# Patient Record
Sex: MALE | Race: WHITE | NOT HISPANIC OR LATINO | Employment: FULL TIME | URBAN - METROPOLITAN AREA
[De-identification: names, ages, dates, MRNs, and addresses within clinical notes are randomized per-mention and may not be internally consistent; named-entity substitution may affect disease eponyms.]

---

## 2021-01-13 ENCOUNTER — TELEPHONE (OUTPATIENT)
Dept: INTERNAL MEDICINE | Age: 31
End: 2021-01-13

## 2021-01-20 ENCOUNTER — OFFICE VISIT (OUTPATIENT)
Dept: INTERNAL MEDICINE | Age: 31
End: 2021-01-20

## 2021-01-20 VITALS
DIASTOLIC BLOOD PRESSURE: 70 MMHG | WEIGHT: 165 LBS | HEIGHT: 68 IN | HEART RATE: 66 BPM | SYSTOLIC BLOOD PRESSURE: 110 MMHG | TEMPERATURE: 97.1 F | OXYGEN SATURATION: 99 % | BODY MASS INDEX: 25.01 KG/M2

## 2021-01-20 DIAGNOSIS — Z00.00 ENCOUNTER FOR ANNUAL HEALTH EXAMINATION: Primary | ICD-10-CM

## 2021-01-20 PROCEDURE — 99385 PREV VISIT NEW AGE 18-39: CPT | Performed by: INTERNAL MEDICINE

## 2021-01-20 RX ORDER — DIPHENOXYLATE HYDROCHLORIDE AND ATROPINE SULFATE 2.5; .025 MG/1; MG/1
TABLET ORAL DAILY
COMMUNITY
End: 2021-08-25

## 2021-01-20 NOTE — PATIENT INSTRUCTIONS
** IMPORTANT MESSAGE FROM DR. LAU **    In our office, your satisfaction is VERY important to us.     You may receive a survey from [facility] by mail or E-mail for you to provide feedback about your visit. This information is invaluable for me to know what we can do to improve our services.     I ask that you please take a few minutes to complete the survey and let us know how we are doing in serving your needs. (You may receive the survey more than once for multiple visits)    Thank You !    Dr. Lau & Staff    _________________________________________________________________________________________________________________________      ** ADDITIONAL INSTRUCTION / REMINDERS FROM DR. LAU **

## 2021-01-20 NOTE — PROGRESS NOTES
"    I N T E R N A L  M E D I C I N E  J U N O H  K I M,  M D      ENCOUNTER DATE:  2021    Brant Ronidanika Iniguez / 30 y.o. / male    CHIEF COMPLAINT     Establish Care      VITALS     Visit Vitals  /70   Pulse 66   Temp 97.1 °F (36.2 °C)   Ht 172.7 cm (68\")   Wt 74.8 kg (165 lb)   SpO2 99%   BMI 25.09 kg/m²       BP Readings from Last 3 Encounters:   21 110/70     Wt Readings from Last 3 Encounters:   21 74.8 kg (165 lb)      Body mass index is 25.09 kg/m².    MEDICATIONS     Current Outpatient Medications   Medication Sig Dispense Refill   • Multiple Vitamins-Minerals (ZINC PO) Take  by mouth.     • multivitamin (MULTI-DAY PO) Take  by mouth Daily.     • QUERCETIN PO Take  by mouth Daily.       No current facility-administered medications for this visit.        _____________________________________________________________________________________    HISTORY OF PRESENT ILLNESS      Brant presents for annual health maintenance visit.  Healthy with no chronic medical problems.   History of neck injury (college football) with torn ligaments with intermittent neck pain and headaches.   Father had a stroke in his 50's (no known risk factor x prior smoking history) and PGF who  at early age.     · Last health maintenance visit: many years ago  · General health: good  · Lifestyle:  · Attempting to lose weight?: No   · Diet: eats moderately healthy  · Exercise: exercises 4 days/week  · Tobacco: Never used   · Alcohol: does not drink  · Work: Full-time  · Reproductive health:  · Sexually active?: Yes   · Concern for STD?: No   · Sexual problems?: No problems   · Sees Urologist?: No   · Depression Screening:      PHQ-2/PHQ-9 Depression Screening 2021   Little interest or pleasure in doing things 0   Feeling down, depressed, or hopeless 0   Total Score 0         PHQ-2: 0 (Not depressed)     PHQ-9: 0 (Negative screening for depression)    Patient Care Team:  Neo Hayes MD as PCP " - General (Internal Medicine)  ______________________________________________________________________    ALLERGIES  No Known Allergies     PFSH:     The following portions of the patient's history were reviewed and updated as appropriate: Allergies / Current Medications / Past Medical History / Surgical History / Social History / Family History    PROBLEM LIST   There is no problem list on file for this patient.      PAST MEDICAL HISTORY  History reviewed. No pertinent past medical history.    SURGICAL HISTORY  History reviewed. No pertinent surgical history.    SOCIAL HISTORY  Social History     Socioeconomic History   • Marital status:      Spouse name: Dennis   • Number of children: 2   • Years of education: Not on file   • Highest education level: Not on file   Occupational History   • Occupation:  ([employer])   Tobacco Use   • Smoking status: Never Smoker   • Smokeless tobacco: Never Used   Substance and Sexual Activity   • Alcohol use: Not Currently     Frequency: Monthly or less     Comment: rare   • Drug use: Never   • Sexual activity: Yes   Lifestyle   • Physical activity     Days per week: 4 days     Minutes per session: Not on file   • Stress: Only a little       FAMILY HISTORY  Family History   Problem Relation Age of Onset   • Breast cancer Mother    • Diabetes Mother    • Hypertension Mother    • Hyperlipidemia Mother    • Stroke Father 50   • No Known Problems Brother    • No Known Problems Daughter    • No Known Problems Son    • Early death Paternal Grandfather 50   • Colon cancer Neg Hx    • Prostate cancer Neg Hx        IMMUNIZATION HISTORY    There is no immunization history on file for this patient.    ______________________________________________________________________    REVIEW OF SYSTEMS     Review of Systems   Constitutional: Negative.    Eyes: Negative.    Respiratory: Negative.    Cardiovascular: Negative.    Gastrointestinal: Negative.     Endocrine: Negative.    Genitourinary: Negative.    Musculoskeletal: Positive for neck pain.   Skin: Negative.    Allergic/Immunologic: Negative.    Neurological: Positive for headaches.   Hematological: Negative.    Psychiatric/Behavioral: Negative.          PHYSICAL EXAMINATION     Physical Exam  Constitutional:       General: He is not in acute distress.     Appearance: He is well-developed.   HENT:      Head: Normocephalic and atraumatic.      Right Ear: Tympanic membrane, ear canal and external ear normal.      Left Ear: Tympanic membrane, ear canal and external ear normal.   Eyes:      General: No scleral icterus.     Conjunctiva/sclera: Conjunctivae normal.      Pupils: Pupils are equal, round, and reactive to light.   Neck:      Musculoskeletal: Neck supple.      Thyroid: No thyroid mass or thyromegaly.      Trachea: No tracheal deviation.   Cardiovascular:      Rate and Rhythm: Normal rate and regular rhythm.      Heart sounds: Normal heart sounds.      Comments: No carotid bruit  Pulmonary:      Effort: Pulmonary effort is normal.      Breath sounds: Normal breath sounds.   Abdominal:      General: There is no distension.      Palpations: Abdomen is soft. There is no mass.      Tenderness: There is no abdominal tenderness.      Hernia: No hernia is present.   Musculoskeletal: Normal range of motion.   Lymphadenopathy:      Cervical: No cervical adenopathy.      Upper Body:      Right upper body: No supraclavicular adenopathy.      Left upper body: No supraclavicular adenopathy.   Skin:     General: Skin is warm.      Coloration: Skin is not pale.      Findings: No lesion (Negative for suspicious skin lesions/growths) or rash.      Comments: No jaundice  No suspicious skin lesions.    Neurological:      Mental Status: He is alert and oriented to person, place, and time.      Cranial Nerves: No cranial nerve deficit.      Motor: No abnormal muscle tone.   Psychiatric:         Mood and Affect: Mood normal.          Behavior: Behavior normal.         Thought Content: Thought content normal.         Judgment: Judgment normal.         REVIEWED DATA      Labs:    No results found for: NA, K, CALCIUM, AST, ALT, BUN, CREATININE, EGFRIFNONA, EGFRIFAFRI    No results found for: GLU, HGBA1C, TSH, FREET4    No results found for: PSA    No results found for: TESTOSTERONE, TESTOSTEROTT, TESTFRE  No results found for: PSA, HCT    No results found for: LDL, HDL, TRIG, CHOLHDLRATIO    No components found for: VTDT361N    No results found for: WBC, HGB, MCV, PLT    No results found for: PROTEIN, GLUCOSEU, BLOODU, NITRITEU, LEUKOCYTESUR     No results found for: HEPCVIRUSABY    Imaging:           Medical Tests:       ______________________________________________________________________    ASSESSMENT & PLAN     ANNUAL WELLNESS EXAM / PHYSICAL     Other medical problems addressed today:  Problem List Items Addressed This Visit     None      Visit Diagnoses     Encounter for annual health examination    -  Primary    Relevant Orders    CBC & Differential    Comprehensive Metabolic Panel    Hemoglobin A1c    Lipid Panel With / Chol / HDL Ratio    TSH+Free T4    Urinalysis With Microscopic If Indicated (No Culture) - Urine, Clean Catch    Hepatitis C Antibody          Summary/Discussion:     · Overall healthy 31 yo male with some family history of early CV disease.   · Evaluate CV / metabolic risk ; check labs     Next Appointment with me: Visit date not found    No follow-ups on file.      HEALTHCARE MAINTENANCE ISSUES       Cancer Screening:  · Colon: Initial/Next screening at age: 44  · Repeat colon cancer screening: N/A at this time  · Prostate: No screening needed at this time  · Testicular: Recommended monthly self exam  · Skin: Monthly self skin examination, annual exam by health professional  · Lung: Does not meet criteria for lung cancer screening.   · Other:    Screening Labs & Tests:  · Lab results reviewed & discussed with with  patient or orders placed today.  · EKG:  · CV Screening: Lipid panel  · DEXA (75+ or risk factors):   · HEP C (If born [id number] or risk factors): Ordered  · Other:     Immunization/Vaccinations (to be given today unless deferred by patient)  · Influenza: Patient deferred/declined flu vaccine (recommended annual vaccination)  · Hepatitis A: Recommended at pharmacy  · Hepatitis B: Not needed at this time  · Tetanus/Pertussis: Verify immunization records  · Pneumovax: Not needed at this time  · Shingles: Not needed at this time  · Other: Had 1st COVID-19 vaccine     Lifestyle Counseling:  · Lifestyle Modifications: Continue good lifestyle choices/modifications, Follow a low fat, low cholesterol diet, Reduce exposure to stress if possible and Discussed sexual issues, safe sex practices, contraception  · Safety Issues: Always wear seatbelt, Avoid texting while driving   · Use sunscreen, regular skin examination  · Recommended annual dental/vision examination.  · Emotional/Stress/Sleep: Reviewed and  given when appropriate      Health Maintenance   Topic Date Due   • ANNUAL PHYSICAL  09/06/1992   • TDAP/TD VACCINES (1 - Tdap) 09/06/2008   • INFLUENZA VACCINE  08/01/2020   • HEPATITIS C SCREENING  01/13/2021   • Pneumococcal Vaccine 0-64  Aged Out   • MENINGOCOCCAL VACCINE  Aged Out         *Examiner was wearing KN95 mask, face shield and exam gloves during the entire duration of the visit. Patient was masked the entire time.   Minimum social distance of 6 ft maintained entire visit except if physical contact was necessary as documented.     **Dragon Disclaimer:   Much of this encounter note is an electronic transcription/translation of spoken language to printed text. The electronic translation of spoken language may permit erroneous, or at times, nonsensical words or phrases to be inadvertently transcribed. Although I have reviewed the note for such errors, some may still exist.       Template created by Sarah  MD Abigail

## 2021-01-28 LAB
ALBUMIN SERPL-MCNC: 4.6 G/DL (ref 3.5–5.2)
ALBUMIN/GLOB SERPL: 1.9 G/DL
ALP SERPL-CCNC: 55 U/L (ref 39–117)
ALT SERPL-CCNC: 16 U/L (ref 1–41)
APPEARANCE UR: CLEAR
AST SERPL-CCNC: 21 U/L (ref 1–40)
BASOPHILS # BLD AUTO: 0.06 10*3/MM3 (ref 0–0.2)
BASOPHILS NFR BLD AUTO: 1.1 % (ref 0–1.5)
BILIRUB SERPL-MCNC: 0.3 MG/DL (ref 0–1.2)
BILIRUB UR QL STRIP: NEGATIVE
BUN SERPL-MCNC: 16 MG/DL (ref 6–20)
BUN/CREAT SERPL: 16.7 (ref 7–25)
CALCIUM SERPL-MCNC: 9.5 MG/DL (ref 8.6–10.5)
CHLORIDE SERPL-SCNC: 104 MMOL/L (ref 98–107)
CHOLEST SERPL-MCNC: 202 MG/DL (ref 0–200)
CHOLEST/HDLC SERPL: 3.37 {RATIO}
CO2 SERPL-SCNC: 25.4 MMOL/L (ref 22–29)
COLOR UR: YELLOW
CREAT SERPL-MCNC: 0.96 MG/DL (ref 0.76–1.27)
EOSINOPHIL # BLD AUTO: 0.19 10*3/MM3 (ref 0–0.4)
EOSINOPHIL NFR BLD AUTO: 3.5 % (ref 0.3–6.2)
ERYTHROCYTE [DISTWIDTH] IN BLOOD BY AUTOMATED COUNT: 12.7 % (ref 12.3–15.4)
GLOBULIN SER CALC-MCNC: 2.4 GM/DL
GLUCOSE SERPL-MCNC: 86 MG/DL (ref 65–99)
GLUCOSE UR QL: NEGATIVE
HBA1C MFR BLD: 5.3 % (ref 4.8–5.6)
HCT VFR BLD AUTO: 42.9 % (ref 37.5–51)
HCV AB S/CO SERPL IA: 0.1 S/CO RATIO (ref 0–0.9)
HDLC SERPL-MCNC: 60 MG/DL (ref 40–60)
HGB BLD-MCNC: 14.1 G/DL (ref 13–17.7)
HGB UR QL STRIP: NEGATIVE
IMM GRANULOCYTES # BLD AUTO: 0.01 10*3/MM3 (ref 0–0.05)
IMM GRANULOCYTES NFR BLD AUTO: 0.2 % (ref 0–0.5)
KETONES UR QL STRIP: ABNORMAL
LDLC SERPL CALC-MCNC: 131 MG/DL (ref 0–100)
LEUKOCYTE ESTERASE UR QL STRIP: NEGATIVE
LYMPHOCYTES # BLD AUTO: 2.08 10*3/MM3 (ref 0.7–3.1)
LYMPHOCYTES NFR BLD AUTO: 38.7 % (ref 19.6–45.3)
MCH RBC QN AUTO: 28.6 PG (ref 26.6–33)
MCHC RBC AUTO-ENTMCNC: 32.9 G/DL (ref 31.5–35.7)
MCV RBC AUTO: 87 FL (ref 79–97)
MONOCYTES # BLD AUTO: 0.37 10*3/MM3 (ref 0.1–0.9)
MONOCYTES NFR BLD AUTO: 6.9 % (ref 5–12)
NEUTROPHILS # BLD AUTO: 2.67 10*3/MM3 (ref 1.7–7)
NEUTROPHILS NFR BLD AUTO: 49.6 % (ref 42.7–76)
NITRITE UR QL STRIP: NEGATIVE
NRBC BLD AUTO-RTO: 0 /100 WBC (ref 0–0.2)
PH UR STRIP: 6.5 [PH] (ref 5–8)
PLATELET # BLD AUTO: 224 10*3/MM3 (ref 140–450)
POTASSIUM SERPL-SCNC: 4.5 MMOL/L (ref 3.5–5.2)
PROT SERPL-MCNC: 7 G/DL (ref 6–8.5)
PROT UR QL STRIP: NEGATIVE
RBC # BLD AUTO: 4.93 10*6/MM3 (ref 4.14–5.8)
SODIUM SERPL-SCNC: 138 MMOL/L (ref 136–145)
SP GR UR: 1.02 (ref 1–1.03)
T4 FREE SERPL-MCNC: 1.23 NG/DL (ref 0.93–1.7)
TRIGL SERPL-MCNC: 62 MG/DL (ref 0–150)
TSH SERPL DL<=0.005 MIU/L-ACNC: 2.64 UIU/ML (ref 0.27–4.2)
UROBILINOGEN UR STRIP-MCNC: ABNORMAL MG/DL
VLDLC SERPL CALC-MCNC: 11 MG/DL (ref 5–40)
WBC # BLD AUTO: 5.38 10*3/MM3 (ref 3.4–10.8)

## 2021-08-25 ENCOUNTER — OFFICE VISIT (OUTPATIENT)
Dept: INTERNAL MEDICINE | Age: 31
End: 2021-08-25

## 2021-08-25 VITALS
HEIGHT: 68 IN | HEART RATE: 62 BPM | DIASTOLIC BLOOD PRESSURE: 60 MMHG | OXYGEN SATURATION: 98 % | BODY MASS INDEX: 24.71 KG/M2 | SYSTOLIC BLOOD PRESSURE: 104 MMHG | WEIGHT: 163 LBS | TEMPERATURE: 97.5 F

## 2021-08-25 DIAGNOSIS — K92.1 BLOOD IN STOOL: Primary | ICD-10-CM

## 2021-08-25 LAB
BASOPHILS # BLD AUTO: 0.05 10*3/MM3 (ref 0–0.2)
BASOPHILS NFR BLD AUTO: 0.8 % (ref 0–1.5)
EOSINOPHIL # BLD AUTO: 0.17 10*3/MM3 (ref 0–0.4)
EOSINOPHIL NFR BLD AUTO: 2.7 % (ref 0.3–6.2)
ERYTHROCYTE [DISTWIDTH] IN BLOOD BY AUTOMATED COUNT: 12.4 % (ref 12.3–15.4)
HCT VFR BLD AUTO: 44.1 % (ref 37.5–51)
HGB BLD-MCNC: 14.2 G/DL (ref 13–17.7)
IMM GRANULOCYTES # BLD AUTO: 0.01 10*3/MM3 (ref 0–0.05)
IMM GRANULOCYTES NFR BLD AUTO: 0.2 % (ref 0–0.5)
LYMPHOCYTES # BLD AUTO: 2.18 10*3/MM3 (ref 0.7–3.1)
LYMPHOCYTES NFR BLD AUTO: 34.5 % (ref 19.6–45.3)
MCH RBC QN AUTO: 28.9 PG (ref 26.6–33)
MCHC RBC AUTO-ENTMCNC: 32.2 G/DL (ref 31.5–35.7)
MCV RBC AUTO: 89.8 FL (ref 79–97)
MONOCYTES # BLD AUTO: 0.36 10*3/MM3 (ref 0.1–0.9)
MONOCYTES NFR BLD AUTO: 5.7 % (ref 5–12)
NEUTROPHILS # BLD AUTO: 3.55 10*3/MM3 (ref 1.7–7)
NEUTROPHILS NFR BLD AUTO: 56.1 % (ref 42.7–76)
NRBC BLD AUTO-RTO: 0 /100 WBC (ref 0–0.2)
PLATELET # BLD AUTO: 237 10*3/MM3 (ref 140–450)
RBC # BLD AUTO: 4.91 10*6/MM3 (ref 4.14–5.8)
WBC # BLD AUTO: 6.32 10*3/MM3 (ref 3.4–10.8)

## 2021-08-25 PROCEDURE — 99213 OFFICE O/P EST LOW 20 MIN: CPT | Performed by: NURSE PRACTITIONER

## 2021-08-25 NOTE — PROGRESS NOTES
"    I N T E R N A L  M E D I C I N E  [facility]      ENCOUNTER DATE:  08/25/2021    Brant Vasquez / 30 y.o. / male      CHIEF COMPLAINT / REASON FOR OFFICE VISIT     Rectal Bleeding (bright red in stool, x2 episodes)      ASSESSMENT & PLAN     1. Blood in stool  -If bright red blood continues, consider referral to gastroenterology for potential need for colonoscopy  - CBC w AUTO Differential    Orders Placed This Encounter   Procedures   • CBC w AUTO Differential     No orders of the defined types were placed in this encounter.      SUMMARY/DISCUSSION  • Follow-up if symptoms do not improve or worsen    Next Appointment with me: Visit date not found    No follow-ups on file.      VITAL SIGNS     Visit Vitals  /60   Pulse 62   Temp 97.5 °F (36.4 °C) (Temporal)   Ht 172.7 cm (68\")   Wt 73.9 kg (163 lb)   SpO2 98%   BMI 24.78 kg/m²     Wt Readings from Last 3 Encounters:   08/25/21 73.9 kg (163 lb)   01/20/21 74.8 kg (165 lb)     Body mass index is 24.78 kg/m².      MEDICATIONS AT THE TIME OF OFFICE VISIT     Current Outpatient Medications on File Prior to Visit   Medication Sig   • Multiple Vitamins-Minerals (ZINC PO) Take  by mouth.   • [DISCONTINUED] multivitamin (MULTI-DAY PO) Take  by mouth Daily.   • [DISCONTINUED] QUERCETIN PO Take  by mouth Daily.     No current facility-administered medications on file prior to visit.         HISTORY OF PRESENT ILLNESS     Patient presents for two occurrences of bright red blood in stool first occurring in March along with this past Sunday, August 14.  He denies any constipation or straining, no diarrhea or abdominal pain or cramping.  No fever chills or malaise.  No recent weight loss.  Bright red blood is mostly while wiping along with small amount in the toilet bowl.  Negative family history of colon cancer.    REVIEW OF SYSTEMS     Constitutional neg except per HPI   Resp neg  CV neg  GI bright red blood in stool    PHYSICAL EXAMINATION "     Physical Exam  Constitutional  No distress  Cardiovascular Rate  normal . Rhythm: regular . Heart sounds:  normal  Pulmonary/Chest  Effort normal. Breath sounds:  normal  GI/ chaperone present; negative external hemorrhoids or anal fissure  Psychiatric  Alert. Judgment and thought content normal. Mood normal       REVIEWED DATA     Labs:         Imaging:           Medical Tests:             Summary of old records / correspondence / consultant report:           Request outside records:           *Examiner was wearing medical surgical mask, face shield and exam gloves during the entire duration of the visit. Patient was masked the entire time.   Minimum social distance of 6 ft maintained entire visit except if physical contact was necessary as documented.     **Dragon Disclaimer:   Much of this encounter note is an electronic transcription/translation of spoken language to printed text. The electronic translation of spoken language may permit erroneous, or at times, nonsensical words or phrases to be inadvertently transcribed. Although I have reviewed the note for such errors, some may still exist.

## 2022-02-02 ENCOUNTER — HOSPITAL ENCOUNTER (OUTPATIENT)
Dept: GENERAL RADIOLOGY | Facility: HOSPITAL | Age: 32
Discharge: HOME | End: 2022-02-02
Admitting: INTERNAL MEDICINE

## 2022-02-02 ENCOUNTER — OFFICE VISIT (OUTPATIENT)
Dept: INTERNAL MEDICINE | Age: 32
End: 2022-02-02

## 2022-02-02 VITALS
WEIGHT: 161 LBS | HEART RATE: 65 BPM | DIASTOLIC BLOOD PRESSURE: 70 MMHG | HEIGHT: 68 IN | OXYGEN SATURATION: 99 % | BODY MASS INDEX: 24.4 KG/M2 | TEMPERATURE: 98 F | SYSTOLIC BLOOD PRESSURE: 100 MMHG

## 2022-02-02 DIAGNOSIS — R07.81 RIB PAIN ON RIGHT SIDE: Primary | ICD-10-CM

## 2022-02-02 PROCEDURE — 99213 OFFICE O/P EST LOW 20 MIN: CPT | Performed by: INTERNAL MEDICINE

## 2022-02-02 PROCEDURE — 71101 X-RAY EXAM UNILAT RIBS/CHEST: CPT

## 2022-02-02 RX ORDER — CHLORAL HYDRATE 500 MG
CAPSULE ORAL
COMMUNITY

## 2022-02-02 NOTE — PATIENT INSTRUCTIONS
** IMPORTANT MESSAGE FROM DR. LAU **    In our office, your satisfaction is VERY important to us.     You may receive a survey from [employer] [facility] by mail or E-mail for you to provide feedback about your visit. This information is invaluable for me to know what we can do to improve our services.     I ask that you please take a few minutes to complete the survey and let us know how we are doing in serving your needs. (You may receive the survey more than once for multiple visits)    Thank You !    Dr. Lau    _________________________________________________________________________________________________________________________      ** ADDITIONAL INSTRUCTION / REMINDERS FROM DR. LAU **    1. Avoid impact activity for 2 weeks to allow complete healing  2. Call for worsening pain  3. May use Voltaren gel 2-3 times daily. May also take Aleve or Motrin as needed for pain.

## 2022-02-03 NOTE — PROGRESS NOTES
MyChart:    Here are the result(s) of your test(s):     No rib fracture is noted on xray.     Please do not hesitate to contact me if you have questions.

## 2022-10-04 ENCOUNTER — OFFICE VISIT (OUTPATIENT)
Dept: INTERNAL MEDICINE | Age: 32
End: 2022-10-04

## 2022-10-04 VITALS
WEIGHT: 159 LBS | OXYGEN SATURATION: 98 % | SYSTOLIC BLOOD PRESSURE: 100 MMHG | BODY MASS INDEX: 24.1 KG/M2 | HEART RATE: 71 BPM | DIASTOLIC BLOOD PRESSURE: 68 MMHG | HEIGHT: 68 IN | TEMPERATURE: 98.2 F

## 2022-10-04 DIAGNOSIS — K64.4 INTERNAL AND EXTERNAL BLEEDING HEMORRHOIDS: Primary | ICD-10-CM

## 2022-10-04 DIAGNOSIS — K64.8 INTERNAL AND EXTERNAL BLEEDING HEMORRHOIDS: Primary | ICD-10-CM

## 2022-10-04 PROCEDURE — 99213 OFFICE O/P EST LOW 20 MIN: CPT | Performed by: INTERNAL MEDICINE

## 2022-10-04 RX ORDER — HYDROCORTISONE ACETATE 25 MG/1
25 SUPPOSITORY RECTAL 2 TIMES DAILY
Qty: 5 SUPPOSITORY | Refills: 0 | Status: SHIPPED | OUTPATIENT
Start: 2022-10-04

## 2022-10-04 NOTE — PROGRESS NOTES
"    I N T E R N A L  M E D I C I N E  J U N O H  K I M,  M D      ENCOUNTER DATE:  10/04/2022    Brant Vasquez / 32 y.o. / male      CHIEF COMPLAINT / REASON FOR OFFICE VISIT     Rectal Bleeding (Happened before a year ago. )      ASSESSMENT & PLAN     Problem List Items Addressed This Visit        Unprioritized    Internal and external bleeding hemorrhoids - Primary    Current Assessment & Plan     On physical examination there is evidence of external and internal hemorrhoids.  There is no evidence of anal fissure.  There is no palpable rectal mass on exam.  There was no gross blood on rectal exam.      Hydrocortisone suppository daily for 5 days and maximum strength Preparation H after each bowel movement.  Recommended using moistened toilet paper with bowel movement.  Avoid straining or sitting longer than needed on the toilet.  Take Benefiber +/- stool softener.  I recommended seeing a colorectal surgeon or general surgeon if symptoms of bleeding continue beyond 1 month.  Patient expressed understanding and agreed to follow the instructions.         Relevant Medications    hydrocortisone (ANUSOL-HC) 25 MG suppository        No orders of the defined types were placed in this encounter.    New Medications Ordered This Visit   Medications   • hydrocortisone (ANUSOL-HC) 25 MG suppository     Sig: Insert 1 suppository into the rectum 2 (Two) Times a Day.     Dispense:  5 suppository     Refill:  0       SUMMARY/DISCUSSION  •       Next Appointment with me: Visit date not found    No follow-ups on file.      VITAL SIGNS     Vitals:    10/04/22 1500   BP: 100/68   BP Location: Left arm   Pulse: 71   Temp: 98.2 °F (36.8 °C)   SpO2: 98%   Weight: 72.1 kg (159 lb)   Height: 172.7 cm (68\")       BP Readings from Last 3 Encounters:   10/04/22 100/68   02/02/22 100/70   08/25/21 104/60     Wt Readings from Last 3 Encounters:   10/04/22 72.1 kg (159 lb)   02/02/22 73 kg (161 lb)   08/25/21 73.9 kg (163 lb) "     Body mass index is 24.18 kg/m².    Blood pressure readings recorded on patient flowsheet:  No flowsheet data found.       MEDICATIONS AT THE TIME OF OFFICE VISIT     Current Outpatient Medications on File Prior to Visit   Medication Sig   • Multiple Vitamins-Minerals (ZINC PO) Take  by mouth.   • Omega-3 Fatty Acids (fish oil) 1000 MG capsule capsule Take  by mouth Daily With Breakfast.   • vitamin D3 125 MCG (5000 UT) capsule capsule Take 5,000 Units by mouth Daily.     No current facility-administered medications on file prior to visit.          HISTORY OF PRESENT ILLNESS     Patient reports approximately 2 years history of intermittent off and on bright red blood after bowel movement on toilet paper.  He has also noticed some drops of bright red blood in the toilet after bowel movement.  He denies any anal or rectal pain.  Denies difficulty with defecation or change in bowel movement or caliber of stools.  Denies family history of colorectal disease including cancer.  He denies any night sweats, weight loss or loss of appetite.  Denies history of abdominal pain, melena or dark/maroon color stools.  Occasionally he has difficult bowel movements with stool that is harder with occasional need for straining.      Patient Care Team:  Neo Hayes MD as PCP - General (Internal Medicine)    REVIEW OF SYSTEMS     Review of Systems       PHYSICAL EXAMINATION     Physical Exam  Constitutional:       Appearance: Normal appearance.   Genitourinary:     Prostate: Normal.      Rectum: External hemorrhoid and internal hemorrhoid present. No mass, tenderness or anal fissure. Normal anal tone.   Neurological:      Mental Status: He is alert.             REVIEWED DATA     Labs:     Lab Results   Component Value Date     01/27/2021    K 4.5 01/27/2021    CALCIUM 9.5 01/27/2021    AST 21 01/27/2021    ALT 16 01/27/2021    BUN 16 01/27/2021    CREATININE 0.96 01/27/2021    EGFRIFNONA 91 01/27/2021    EGFRIFAFRI 111  01/27/2021       Lab Results   Component Value Date    HGBA1C 5.30 01/27/2021       Lab Results   Component Value Date     (H) 01/27/2021    HDL 60 01/27/2021    TRIG 62 01/27/2021       Lab Results   Component Value Date    TSH 2.640 01/27/2021    FREET4 1.23 01/27/2021       Lab Results   Component Value Date    WBC 6.32 08/25/2021    HGB 14.2 08/25/2021     08/25/2021       No results found for: MALBCRERATIO       Imaging:           Medical Tests:           Summary of old records / correspondence / consultant report:           Request outside records:             *Examiner was wearing KN95 mask and eye protection during the entire duration of the visit. Patient was masked the entire time. Minimum social distance of 6 ft maintained entire visit except if physical contact was necessary as documented.       Template created by Sarah Hayes MD

## 2022-10-04 NOTE — ASSESSMENT & PLAN NOTE
On physical examination there is evidence of external and internal hemorrhoids.  There is no evidence of anal fissure.  There is no palpable rectal mass on exam.  There was no gross blood on rectal exam.      Hydrocortisone suppository daily for 5 days and maximum strength Preparation H after each bowel movement.  Recommended using moistened toilet paper with bowel movement.  Avoid straining or sitting longer than needed on the toilet.  Take Benefiber +/- stool softener.  I recommended seeing a colorectal surgeon or general surgeon if symptoms of bleeding continue beyond 1 month.  Patient expressed understanding and agreed to follow the instructions.

## 2023-10-03 ENCOUNTER — TELEPHONE (OUTPATIENT)
Dept: INTERNAL MEDICINE | Age: 33
End: 2023-10-03
Payer: COMMERCIAL

## 2023-10-03 DIAGNOSIS — Z00.00 HEALTHCARE MAINTENANCE: Primary | ICD-10-CM

## 2023-11-08 ENCOUNTER — OFFICE VISIT (OUTPATIENT)
Dept: INTERNAL MEDICINE | Age: 33
End: 2023-11-08
Payer: COMMERCIAL

## 2023-11-08 VITALS
HEART RATE: 78 BPM | OXYGEN SATURATION: 99 % | DIASTOLIC BLOOD PRESSURE: 88 MMHG | HEIGHT: 68 IN | TEMPERATURE: 97.3 F | BODY MASS INDEX: 25.31 KG/M2 | WEIGHT: 167 LBS | SYSTOLIC BLOOD PRESSURE: 126 MMHG

## 2023-11-08 DIAGNOSIS — Z00.00 ENCOUNTER FOR ANNUAL HEALTH EXAMINATION: Primary | ICD-10-CM

## 2023-11-08 DIAGNOSIS — R07.89 ATYPICAL CHEST PAIN: ICD-10-CM

## 2023-11-08 DIAGNOSIS — E66.3 OVERWEIGHT (BMI 25.0-29.9): ICD-10-CM

## 2023-11-08 DIAGNOSIS — F43.9 STRESS: ICD-10-CM

## 2023-11-08 DIAGNOSIS — E78.00 PURE HYPERCHOLESTEROLEMIA: ICD-10-CM

## 2023-11-08 PROBLEM — K64.8 INTERNAL AND EXTERNAL BLEEDING HEMORRHOIDS: Status: RESOLVED | Noted: 2022-10-04 | Resolved: 2023-11-08

## 2023-11-08 PROBLEM — K64.4 INTERNAL AND EXTERNAL BLEEDING HEMORRHOIDS: Status: RESOLVED | Noted: 2022-10-04 | Resolved: 2023-11-08

## 2023-11-08 NOTE — PROGRESS NOTES
"    I N T E R N A L  M E D I C I N E    J U N O H  K I M,  M D      ENCOUNTER DATE:  11/08/2023    Brant Roni Pedro / 33 y.o. / male    CHIEF COMPLAINT     Annual Exam (1/20/21) and High stress level / atypical chest pressure      VITALS     Vitals:    11/08/23 0745   BP: 126/88   Pulse: 78   Temp: 97.3 °F (36.3 °C)   SpO2: 99%   Weight: 75.8 kg (167 lb)   Height: 172.7 cm (68\")       BP Readings from Last 3 Encounters:   11/08/23 126/88   10/04/22 100/68   02/02/22 100/70     Wt Readings from Last 3 Encounters:   11/08/23 75.8 kg (167 lb)   10/04/22 72.1 kg (159 lb)   02/02/22 73 kg (161 lb)      Body mass index is 25.39 kg/m².    Blood pressure readings recorded on patient flowsheet:       No data to display                MEDICATIONS     Current Outpatient Medications on File Prior to Visit   Medication Sig Dispense Refill    Multiple Vitamins-Minerals (ZINC PO) Take  by mouth.      Omega-3 Fatty Acids (fish oil) 1000 MG capsule capsule Take  by mouth Daily With Breakfast.      vitamin D3 125 MCG (5000 UT) capsule capsule Take 1 capsule by mouth Daily.      [DISCONTINUED] hydrocortisone (ANUSOL-HC) 25 MG suppository Insert 1 suppository into the rectum 2 (Two) Times a Day. (Patient not taking: Reported on 11/8/2023) 5 suppository 0     No current facility-administered medications on file prior to visit.         HISTORY OF PRESENT ILLNESS      Brant presents for annual health maintenance visit.    Patient has gained 7 to 8 pounds since his last visit. His blood pressure is slightly elevated today. He has not exercised in the last 4 to 5 months due to work. LDL increased from 131 mg/dL to 153 mg/dL. HDL has improved from 60 mg/dL to 76 mg/dL. He is currently taking Omega 3, fish oil and vitamin D3.    His stress level is \"11 out of 5\" due to work. He reports atypical chest discomfort when he is stressed about work. Denies exertional related chest pain. He is not sleeping well and states he is " managing stress the best he can. Currently he is working 11 hours a day 7 days a week.     He has a history of internal and external hemorrhoids that have resolved.     All vaccines and health maintenance screenings were discussed.       General health: good  Lifestyle:  Attempting to lose weight?: No   Diet: eats decently  Exercise: has not been as active recently  Tobacco: Never used   Alcohol: occasional/infrequent  Work: Full-time  Reproductive health:  Sexually active?: Yes   Concern for STD?: No   Sexual problems?: No problems   Sees Urologist?: No   Depression Screenin/8/2023     7:45 AM   PHQ-2/PHQ-9 Depression Screening   Little Interest or Pleasure in Doing Things 0-->not at all   Feeling Down, Depressed or Hopeless 0-->not at all   PHQ-9: Brief Depression Severity Measure Score 0         PHQ-2: 0 (Not depressed)     PHQ-9: 0 (Negative screening for depression)    Patient Care Team:  Neo Hayes MD as PCP - General (Internal Medicine)  ______________________________________________________________________    ALLERGIES  No Known Allergies     PFSH:     The following portions of the patient's history were reviewed and updated as appropriate: Allergies / Current Medications / Past Medical History / Surgical History / Social History / Family History    PROBLEM LIST   Patient Active Problem List   Diagnosis    Overweight (BMI 25.0-29.9)    Pure hypercholesterolemia    Stress    Atypical chest pain       PAST MEDICAL HISTORY  Past Medical History:   Diagnosis Date    Internal and external bleeding hemorrhoids        SURGICAL HISTORY  History reviewed. No pertinent surgical history.    SOCIAL HISTORY  Social History     Socioeconomic History    Marital status:      Spouse name: Dennis    Number of children: 2   Tobacco Use    Smoking status: Never    Smokeless tobacco: Never   Vaping Use    Vaping Use: Never used   Substance and Sexual Activity    Alcohol use: Not Currently     Comment: rare     Drug use: Never    Sexual activity: Yes       FAMILY HISTORY  Family History   Problem Relation Age of Onset    Breast cancer Mother     Diabetes type II Mother     Hypertension Mother     Hyperlipidemia Mother     Stroke Father 50        smoker    No Known Problems Brother     Alzheimer's disease Maternal Grandmother 80    Early death Paternal Grandfather 50    No Known Problems Daughter     No Known Problems Son     Colon cancer Neg Hx     Prostate cancer Neg Hx        IMMUNIZATION HISTORY  Immunization History   Administered Date(s) Administered    COVID-19 (PFIZER) Purple Cap Monovalent 01/13/2021, 02/03/2021         REVIEW OF SYSTEMS     Review of Systems   Constitutional:  Positive for unexpected weight change (wt gain).   HENT: Negative.     Eyes: Negative.    Respiratory: Negative.  Negative for apnea.    Cardiovascular: Negative.  Negative for palpitations. Chest pain: atypical chest pressure with stress.  Gastrointestinal: Negative.    Endocrine: Negative.    Genitourinary: Negative.    Musculoskeletal: Negative.    Skin: Negative.    Allergic/Immunologic: Negative.    Neurological: Negative.    Hematological: Negative.    Psychiatric/Behavioral: Negative.          High stress level due to work          PHYSICAL EXAMINATION     Physical Exam  Constitutional:       General: He is not in acute distress.     Appearance: He is well-developed.      Comments: Weight gain noted    HENT:      Head: Normocephalic and atraumatic.      Right Ear: Tympanic membrane, ear canal and external ear normal.      Left Ear: Tympanic membrane, ear canal and external ear normal.      Mouth/Throat:      Mouth: Mucous membranes are moist.      Pharynx: Oropharynx is clear.   Eyes:      General: No scleral icterus.     Conjunctiva/sclera: Conjunctivae normal.      Pupils: Pupils are equal, round, and reactive to light.   Neck:      Thyroid: No thyroid mass or thyromegaly.      Vascular: No carotid bruit.      Trachea: No  tracheal deviation.   Cardiovascular:      Rate and Rhythm: Normal rate and regular rhythm.      Pulses: Normal pulses.      Heart sounds: Normal heart sounds. No murmur heard.     Comments: No carotid bruit  Pulmonary:      Effort: Pulmonary effort is normal.      Breath sounds: Normal breath sounds.   Abdominal:      General: There is no distension.      Palpations: Abdomen is soft. There is no mass.      Tenderness: There is no abdominal tenderness.      Hernia: No hernia is present.   Musculoskeletal:         General: Normal range of motion.      Cervical back: Neck supple.      Right lower leg: No edema.      Left lower leg: No edema.   Lymphadenopathy:      Cervical: No cervical adenopathy.      Upper Body:      Right upper body: No axillary adenopathy.      Left upper body: No axillary adenopathy.   Skin:     General: Skin is warm.      Coloration: Skin is not jaundiced or pale.      Findings: No lesion (Negative for suspicious skin lesions/growths) or rash.      Comments: No jaundice  No suspicious skin lesions.    Neurological:      Mental Status: He is alert and oriented to person, place, and time.      Cranial Nerves: No cranial nerve deficit.      Motor: No abnormal muscle tone.      Deep Tendon Reflexes: Reflexes normal.   Psychiatric:         Attention and Perception: Attention normal.         Mood and Affect: Mood normal. Anxious: appears somewhat stressed.         Speech: Speech normal.         Behavior: Behavior normal.         Thought Content: Thought content normal.         Cognition and Memory: Cognition normal.         Judgment: Judgment normal.         REVIEWED DATA      Labs:    Lab Results   Component Value Date     10/13/2023    K 4.3 10/13/2023    CALCIUM 10.1 10/13/2023    AST 19 10/13/2023    ALT 14 10/13/2023    BUN 12 10/13/2023    CREATININE 1.06 10/13/2023    CREATININE 0.96 01/27/2021    EGFRIFNONA 91 01/27/2021    EGFRIFAFRI 111 01/27/2021       Lab Results   Component Value  "Date    GLUCOSE 99 10/13/2023    HGBA1C 5.60 10/13/2023    HGBA1C 5.30 01/27/2021    TSH 3.960 10/13/2023    FREET4 1.37 10/13/2023       No results found for: \"PSA\"    No results found for: \"TESTOSTERONE\", \"TESTOSTEROTT\", \"TESTFRE\"    Lab Results   Component Value Date     (H) 10/13/2023    HDL 76 (H) 10/13/2023    TRIG 60 10/13/2023    CHOLHDLRATIO 3.14 10/13/2023       No components found for: \"TTKN526U\"    Lab Results   Component Value Date    WBC 6.51 10/13/2023    HGB 14.6 10/13/2023    MCV 87.1 10/13/2023     10/13/2023       Lab Results   Component Value Date    PROTEIN Trace (A) 10/13/2023    GLUCOSEU Negative 10/13/2023    BLOODU Negative 10/13/2023    NITRITEU Negative 10/13/2023    LEUKOCYTESUR Negative 10/13/2023          Lab Results   Component Value Date    HEPCVIRUSABY 0.1 01/27/2021       Imaging:           Medical Tests:           ASSESSMENT & PLAN     ANNUAL WELLNESS EXAM / PHYSICAL     Other medical problems addressed today:  Problem List Items Addressed This Visit          High    Pure hypercholesterolemia (Chronic)    Current Assessment & Plan     Lab Results   Component Value Date     (H) 10/13/2023     (H) 01/27/2021    TRIG 60 10/13/2023    CHOLHDLRATIO 3.14 10/13/2023      This is a new problem to this examiner.   Maintain low saturated fat/cholesterol diet.    Increase physical activity and weight loss recommended.  Consider NMR Lipoprofile in 6 months.          Relevant Orders    Lipid Panel With / Chol / HDL Ratio       Medium    Overweight (BMI 25.0-29.9) (Chronic)    Current Assessment & Plan     Wt Readings from Last 3 Encounters:   11/08/23 75.8 kg (167 lb)   10/04/22 72.1 kg (159 lb)   02/02/22 73 kg (161 lb)   Body mass index is 25.39 kg/m².     Weight gain due to stress and inactivity.   Maintain a low sugar/starch/carbohydrate diet.   Increase physical activity.   Weight loss of 10 lbs recommended.          Stress (Chronic)    Current Assessment & Plan "     Counseled on stress management.   Advised to walk for exercise/relaxation daily.   Take time to eat healthy and maintain good sleep hygiene.   Has psychogenic chest discomfort which is highly unlikely due to cardiac cause but suggested doing an ETT if he does have ongoing symptoms.   He expressed understanding and agreed to follow above instructions.             Unprioritized    Atypical chest pain    Current Assessment & Plan     Refer to section on stress           Other Visit Diagnoses       Encounter for annual health examination    -  Primary    Relevant Orders    CBC & Differential    Comprehensive Metabolic Panel    Lipid Panel With / Chol / HDL Ratio    TSH+Free T4    Urinalysis With Microscopic If Indicated (No Culture) - Urine, Clean Catch    Hemoglobin A1c            Summary/Discussion:         Next Appointment with me: Visit date not found    Return in about 1 year (around 11/8/2024) for SCHEDULE ANNUAL PHYSICAL & MEDICAL FU NEXT YEAR.      HEALTHCARE MAINTENANCE ISSUES       Cancer Screening:  Colon: Initial/Next screening at age: 44  Repeat colon cancer screening: N/A at this time  Prostate: No screening needed at this time  Testicular: Recommended monthly self exam  Skin: Monthly self skin examination, annual exam by health professional  Lung: Does not meet criteria for lung cancer screening.   Other:    Screening Labs & Tests:  Lab results reviewed & discussed with with patient or orders placed today.  EKG:  CV Screening: Lipid panel  DEXA (75+ or risk factors):   HEP C (If born [id number] or risk factors): Previously had negative screen  Other:     Immunization/Vaccinations (to be given today unless deferred by patient)  Influenza: Patient deferred/declined flu vaccine (recommended annual vaccination)  Hepatitis A: Declined by patient  Hepatitis B: Verify immunization records  Tetanus/Pertussis: Recommended here or at pharmacy  Pneumococcal: Not needed at this time  Shingles: Not needed at this  time  COVID: Plans to not take the vaccine  RSV:   Lifestyle Counseling:  Lifestyle Modifications: Attempt to lose weight, Improve dietary compliance, Begin progressive aerobic exercise program 3-5 days a week, Maintain a low sugar/carbohydrate diet, Follow a low fat, low cholesterol diet, Maintain low sodium diet (< 3 gm) for blood pressure, Make dinner the lightest meal of day, Reduce exposure to stress if possible, Discussed better management of stress/anxiety, and Discussed sexual issues, safe sex practices, contraception  Safety Issues: Always wear seatbelt, Avoid texting while driving   Use sunscreen, regular skin examination  Recommended annual dental/vision examination.  Emotional/Stress/Sleep: Reviewed and  given when appropriate      Health Maintenance   Topic Date Due    BMI FOLLOWUP  Never done    TDAP/TD VACCINES (1 - Tdap) Never done    ANNUAL PHYSICAL  01/20/2022    COVID-19 Vaccine (3 - 2023-24 season) 09/01/2023    INFLUENZA VACCINE  03/31/2024 (Originally 8/1/2023)    LIPID PANEL  10/13/2024    HEPATITIS C SCREENING  Completed    Pneumococcal Vaccine 0-64  Aged Out       Transcribed from ambient dictation for Neo Hayes MD by Miranda Obando.  11/08/23   09:30 EST    Patient or patient representative verbalized consent to the visit recording.  I have personally performed the services described in this document as transcribed by the above individual, and it is both accurate and complete.  Neo Hayes MD  11/8/2023  09:53 EST

## 2023-11-08 NOTE — PATIENT INSTRUCTIONS
** IMPORTANT MESSAGE FROM DR. LAU **    In our office, your satisfaction is VERY important to us.     You may receive a survey from [employer] by mail or E-mail for you to provide feedback about your visit. This information is invaluable for me to know what we can do to improve our services.     I ask that you please take a few minutes to complete the survey and let us know how we are doing in serving your needs. (You may receive the survey more than once for multiple visits)    Thank You !    Dr. Lau    _________________________________________________________________________________________________________________________      ** ADDITIONAL INSTRUCTION / REMINDERS FROM DR. LAU **    OBTAIN THESE VACCINES AT THE PHARMACY:    Tdap (Adacel)

## 2023-11-08 NOTE — ASSESSMENT & PLAN NOTE
Counseled on stress management.   Advised to walk for exercise/relaxation daily.   Take time to eat healthy and maintain good sleep hygiene.   Has psychogenic chest discomfort which is highly unlikely due to cardiac cause but suggested doing an ETT if he does have ongoing symptoms.   He expressed understanding and agreed to follow above instructions.

## 2023-11-08 NOTE — ASSESSMENT & PLAN NOTE
Wt Readings from Last 3 Encounters:   11/08/23 75.8 kg (167 lb)   10/04/22 72.1 kg (159 lb)   02/02/22 73 kg (161 lb)     Body mass index is 25.39 kg/m².     Weight gain due to stress and inactivity.   Maintain a low sugar/starch/carbohydrate diet.   Increase physical activity.   Weight loss of 10 lbs recommended.

## 2023-11-08 NOTE — ASSESSMENT & PLAN NOTE
Lab Results   Component Value Date     (H) 10/13/2023     (H) 01/27/2021    TRIG 60 10/13/2023    CHOLHDLRATIO 3.14 10/13/2023      This is a new problem to this examiner.   Maintain low saturated fat/cholesterol diet.    Increase physical activity and weight loss recommended.  Consider NMR Lipoprofile in 6 months.

## 2023-12-04 DIAGNOSIS — E78.00 PURE HYPERCHOLESTEROLEMIA: Primary | Chronic | ICD-10-CM

## 2024-08-21 ENCOUNTER — TELEPHONE (OUTPATIENT)
Dept: INTERNAL MEDICINE | Age: 34
End: 2024-08-21
Payer: COMMERCIAL

## 2024-08-21 NOTE — TELEPHONE ENCOUNTER
Lvm regarding open labs   Schedule lab myrna only for NMR or if not planing to get this done so the order need to be cancelled

## 2024-08-26 ENCOUNTER — TELEPHONE (OUTPATIENT)
Dept: INTERNAL MEDICINE | Age: 34
End: 2024-08-26

## 2024-10-16 ENCOUNTER — TELEPHONE (OUTPATIENT)
Dept: INTERNAL MEDICINE | Age: 34
End: 2024-10-16
Payer: COMMERCIAL

## 2024-11-04 DIAGNOSIS — Z00.00 ENCOUNTER FOR ANNUAL HEALTH EXAMINATION: ICD-10-CM

## 2024-11-04 DIAGNOSIS — E78.00 PURE HYPERCHOLESTEROLEMIA: ICD-10-CM

## 2024-11-04 LAB
ALBUMIN SERPL-MCNC: 4.5 G/DL (ref 3.5–5.2)
ALBUMIN/GLOB SERPL: 1.7 G/DL
ALP SERPL-CCNC: 75 U/L (ref 39–117)
ALT SERPL-CCNC: 15 U/L (ref 1–41)
APPEARANCE UR: CLEAR
AST SERPL-CCNC: 18 U/L (ref 1–40)
BASOPHILS # BLD AUTO: 0.07 10*3/MM3 (ref 0–0.2)
BASOPHILS NFR BLD AUTO: 1.3 % (ref 0–1.5)
BILIRUB SERPL-MCNC: 0.5 MG/DL (ref 0–1.2)
BILIRUB UR QL STRIP: NEGATIVE
BUN SERPL-MCNC: 13 MG/DL (ref 6–20)
BUN/CREAT SERPL: 12.7 (ref 7–25)
CALCIUM SERPL-MCNC: 9.6 MG/DL (ref 8.6–10.5)
CHLORIDE SERPL-SCNC: 102 MMOL/L (ref 98–107)
CHOLEST SERPL-MCNC: 230 MG/DL (ref 0–200)
CHOLEST/HDLC SERPL: 3.19 {RATIO}
CO2 SERPL-SCNC: 25.8 MMOL/L (ref 22–29)
COLOR UR: YELLOW
CREAT SERPL-MCNC: 1.02 MG/DL (ref 0.76–1.27)
EGFRCR SERPLBLD CKD-EPI 2021: 98.3 ML/MIN/1.73
EOSINOPHIL # BLD AUTO: 0.23 10*3/MM3 (ref 0–0.4)
EOSINOPHIL NFR BLD AUTO: 4.3 % (ref 0.3–6.2)
ERYTHROCYTE [DISTWIDTH] IN BLOOD BY AUTOMATED COUNT: 12.2 % (ref 12.3–15.4)
GLOBULIN SER CALC-MCNC: 2.6 GM/DL
GLUCOSE SERPL-MCNC: 93 MG/DL (ref 65–99)
GLUCOSE UR QL STRIP: NEGATIVE
HBA1C MFR BLD: 5.3 % (ref 4.8–5.6)
HCT VFR BLD AUTO: 42.1 % (ref 37.5–51)
HDLC SERPL-MCNC: 72 MG/DL (ref 40–60)
HGB BLD-MCNC: 14.4 G/DL (ref 13–17.7)
HGB UR QL STRIP: NEGATIVE
IMM GRANULOCYTES # BLD AUTO: 0.01 10*3/MM3 (ref 0–0.05)
IMM GRANULOCYTES NFR BLD AUTO: 0.2 % (ref 0–0.5)
KETONES UR QL STRIP: NEGATIVE
LDLC SERPL CALC-MCNC: 149 MG/DL (ref 0–100)
LEUKOCYTE ESTERASE UR QL STRIP: NEGATIVE
LYMPHOCYTES # BLD AUTO: 2.03 10*3/MM3 (ref 0.7–3.1)
LYMPHOCYTES NFR BLD AUTO: 37.7 % (ref 19.6–45.3)
MCH RBC QN AUTO: 29.6 PG (ref 26.6–33)
MCHC RBC AUTO-ENTMCNC: 34.2 G/DL (ref 31.5–35.7)
MCV RBC AUTO: 86.6 FL (ref 79–97)
MONOCYTES # BLD AUTO: 0.38 10*3/MM3 (ref 0.1–0.9)
MONOCYTES NFR BLD AUTO: 7.1 % (ref 5–12)
NEUTROPHILS # BLD AUTO: 2.66 10*3/MM3 (ref 1.7–7)
NEUTROPHILS NFR BLD AUTO: 49.4 % (ref 42.7–76)
NITRITE UR QL STRIP: NEGATIVE
NRBC BLD AUTO-RTO: 0 /100 WBC (ref 0–0.2)
PH UR STRIP: 6 [PH] (ref 5–8)
PLATELET # BLD AUTO: 222 10*3/MM3 (ref 140–450)
POTASSIUM SERPL-SCNC: 4.1 MMOL/L (ref 3.5–5.2)
PROT SERPL-MCNC: 7.1 G/DL (ref 6–8.5)
PROT UR QL STRIP: NEGATIVE
RBC # BLD AUTO: 4.86 10*6/MM3 (ref 4.14–5.8)
SODIUM SERPL-SCNC: 138 MMOL/L (ref 136–145)
SP GR UR STRIP: 1.02 (ref 1–1.03)
T4 FREE SERPL-MCNC: 1.21 NG/DL (ref 0.92–1.68)
TRIGL SERPL-MCNC: 53 MG/DL (ref 0–150)
TSH SERPL DL<=0.005 MIU/L-ACNC: 2.93 UIU/ML (ref 0.27–4.2)
UROBILINOGEN UR STRIP-MCNC: NORMAL MG/DL
VLDLC SERPL CALC-MCNC: 9 MG/DL (ref 5–40)
WBC # BLD AUTO: 5.38 10*3/MM3 (ref 3.4–10.8)

## 2024-11-11 ENCOUNTER — OFFICE VISIT (OUTPATIENT)
Dept: INTERNAL MEDICINE | Age: 34
End: 2024-11-11
Payer: COMMERCIAL

## 2024-11-11 VITALS
HEART RATE: 63 BPM | TEMPERATURE: 97.1 F | SYSTOLIC BLOOD PRESSURE: 120 MMHG | WEIGHT: 178 LBS | OXYGEN SATURATION: 97 % | HEIGHT: 68 IN | BODY MASS INDEX: 26.98 KG/M2 | DIASTOLIC BLOOD PRESSURE: 76 MMHG

## 2024-11-11 DIAGNOSIS — E66.3 OVERWEIGHT (BMI 25.0-29.9): Primary | Chronic | ICD-10-CM

## 2024-11-11 DIAGNOSIS — Z00.00 ENCOUNTER FOR ANNUAL HEALTH EXAMINATION: ICD-10-CM

## 2024-11-11 DIAGNOSIS — R53.83 FATIGUE, UNSPECIFIED TYPE: ICD-10-CM

## 2024-11-11 DIAGNOSIS — E78.00 PURE HYPERCHOLESTEROLEMIA: Chronic | ICD-10-CM

## 2024-11-11 DIAGNOSIS — E55.9 VITAMIN D DEFICIENCY: ICD-10-CM

## 2024-11-11 DIAGNOSIS — R07.89 ATYPICAL CHEST PAIN: ICD-10-CM

## 2024-11-11 PROCEDURE — 99213 OFFICE O/P EST LOW 20 MIN: CPT | Performed by: INTERNAL MEDICINE

## 2024-11-11 PROCEDURE — 99395 PREV VISIT EST AGE 18-39: CPT | Performed by: INTERNAL MEDICINE

## 2024-11-11 RX ORDER — AMPICILLIN TRIHYDRATE 250 MG
600 CAPSULE ORAL DAILY
COMMUNITY
Start: 2024-11-11

## 2024-11-11 NOTE — ASSESSMENT & PLAN NOTE
Wt Readings from Last 3 Encounters:   11/11/24 80.7 kg (178 lb)   11/08/23 75.8 kg (167 lb)   10/04/22 72.1 kg (159 lb)     Body mass index is 27.06 kg/m².     Maintain a low sugar/starch/carbohydrate diet.   Weight loss of 10-15 lbs recommended.

## 2024-11-11 NOTE — PROGRESS NOTES
"                             J  U  N  O  H    K  I  M ,   M  D                  I  N  T  E  R  N  A  L    M  E  D  I  C  I  N  E         ENCOUNTER DATE:  11/11/2024    Brant Roni Pedro / 34 y.o. / male    ANNUAL PREVENTATIVE / PHYSICAL ENCOUNTER       CHIEF COMPLAINT     Annual Exam (11/8/23) and chronic medical problems      VITALS     Vitals:    11/11/24 0729   BP: 120/76   Pulse: 63   Temp: 97.1 °F (36.2 °C)   SpO2: 97%   Weight: 80.7 kg (178 lb)   Height: 172.7 cm (68\")       BP Readings from Last 3 Encounters:   11/11/24 120/76   11/08/23 126/88   10/04/22 100/68     Wt Readings from Last 3 Encounters:   11/11/24 80.7 kg (178 lb)   11/08/23 75.8 kg (167 lb)   10/04/22 72.1 kg (159 lb)      Body mass index is 27.06 kg/m².    Blood pressure readings recorded on patient flowsheet:       No data to display                MEDICATIONS     Current Outpatient Medications on File Prior to Visit   Medication Sig Dispense Refill    Multiple Vitamins-Minerals (ZINC PO) Take  by mouth.      vitamin D3 125 MCG (5000 UT) capsule capsule Take 1 capsule by mouth Daily.      [DISCONTINUED] Omega-3 Fatty Acids (fish oil) 1000 MG capsule capsule Take  by mouth Daily With Breakfast. (Patient not taking: Reported on 11/11/2024)       No current facility-administered medications on file prior to visit.         HISTORY OF PRESENT ILLNESS      Brant presents for annual health maintenance visit.    Still has very atypical chest discomfort associated with stress but totally asymptomatic with physical exertion/exercise.     LDL cholesterol remains high. LDL-P is high. HDL (good cholesterol) remains favorable. Father had stroke in 50's but was a smoker.  Stress from work remains on high side. He would like to check for Apo A/B.     He would like to check testosterone level and vitamin D due to fatigue.       Patient Care Team:  Neo Hayes MD as PCP - General (Internal Medicine)    General health: some medical " problems  Lifestyle:  Attempting to lose weight?: No   Diet: eats decently  Exercise: has not been as active recently  Tobacco: Never used  Alcohol: does not drink  Work: Full-time  Reproductive health:  Sexually active?: Yes   Concern for STD?: No   Sexual problems?: No problems   Sees Urologist?: No   Depression Screening:      PHQ-2 Depression Screening  Little interest or pleasure in doing things? Not at all   Feeling down, depressed, or hopeless? Not at all   PHQ-2 Total Score 0            11/11/2024     7:31 AM   PHQ-2/PHQ-9 Depression Screening   Little interest or pleasure in doing things Not at all   Feeling down, depressed, or hopeless Not at all   How difficult have these problems made it for you to do your work, take care of things at home, or get along with other people? Not difficult at all        PHQ-2: 0 (Not depressed)     PHQ-9: 0 (Negative screening for depression)    ______________________________________________________________________    ALLERGIES  No Known Allergies     PFSH:     The following portions of the patient's history were reviewed and updated as appropriate: Allergies / Current Medications / Past Medical History / Surgical History / Social History / Family History    PROBLEM LIST   Patient Active Problem List   Diagnosis    Overweight (BMI 25.0-29.9)    Pure hypercholesterolemia    Stress    Atypical chest pain       PAST MEDICAL HISTORY  Past Medical History:   Diagnosis Date    Internal and external bleeding hemorrhoids        SURGICAL HISTORY  Past Surgical History:   Procedure Laterality Date    NO PAST SURGERIES         SOCIAL HISTORY  Social History     Socioeconomic History    Marital status:      Spouse name: Dennis    Number of children: 2   Tobacco Use    Smoking status: Never    Smokeless tobacco: Never   Vaping Use    Vaping status: Never Used   Substance and Sexual Activity    Alcohol use: Not Currently     Comment: rare    Drug use: Never    Sexual activity: Yes      Partners: Female     Birth control/protection: None       FAMILY HISTORY  Family History   Problem Relation Age of Onset    Breast cancer Mother     Diabetes type II Mother     Hypertension Mother     Hyperlipidemia Mother     Dementia Mother     Stroke Father 50        smoker    No Known Problems Brother     Alzheimer's disease Maternal Grandmother 80    Early death Paternal Grandfather 50    No Known Problems Daughter     No Known Problems Son     Colon cancer Neg Hx     Prostate cancer Neg Hx        IMMUNIZATION HISTORY  Immunization History   Administered Date(s) Administered    COVID-19 (PFIZER) Purple Cap Monovalent 01/13/2021, 02/03/2021         REVIEW OF SYSTEMS     Review of Systems   Constitutional: Negative.  Unexpected weight change: mild wt gain.   HENT: Negative.     Eyes: Negative.    Respiratory: Negative.     Cardiovascular: Negative.  Negative for palpitations. Chest pain: atypical.  Gastrointestinal: Negative.    Endocrine: Negative.    Genitourinary: Negative.    Musculoskeletal: Negative.    Skin: Negative.    Allergic/Immunologic: Negative.    Neurological: Negative.    Hematological: Negative.    Psychiatric/Behavioral: Negative.          High stress          PHYSICAL EXAMINATION     Physical Exam  Constitutional:       General: He is not in acute distress.     Appearance: He is well-developed and overweight.   HENT:      Head: Normocephalic and atraumatic.      Right Ear: Tympanic membrane, ear canal and external ear normal.      Left Ear: Tympanic membrane, ear canal and external ear normal.      Mouth/Throat:      Mouth: Mucous membranes are moist.      Pharynx: Oropharynx is clear.   Eyes:      General: No scleral icterus.     Conjunctiva/sclera: Conjunctivae normal.      Pupils: Pupils are equal, round, and reactive to light.   Neck:      Thyroid: No thyroid mass or thyromegaly.      Vascular: No carotid bruit.      Trachea: No tracheal deviation.   Cardiovascular:      Rate and  Rhythm: Normal rate and regular rhythm.      Pulses: Normal pulses.      Heart sounds: Normal heart sounds.      Comments: No carotid bruit  Pulmonary:      Effort: Pulmonary effort is normal.      Breath sounds: Normal breath sounds.   Abdominal:      General: There is no distension.      Palpations: Abdomen is soft. There is no mass.      Tenderness: There is no abdominal tenderness.      Hernia: No hernia is present.   Musculoskeletal:      Cervical back: Neck supple.      Right lower leg: No edema.      Left lower leg: No edema.   Lymphadenopathy:      Cervical: No cervical adenopathy.      Upper Body:      Right upper body: No axillary adenopathy.      Left upper body: No axillary adenopathy.   Skin:     General: Skin is warm.      Coloration: Skin is not jaundiced or pale.      Findings: No lesion (Negative for suspicious skin lesions/growths) or rash.      Comments: No jaundice  No suspicious skin lesions.    Neurological:      Mental Status: He is alert and oriented to person, place, and time.      Cranial Nerves: No cranial nerve deficit.      Motor: No abnormal muscle tone.   Psychiatric:         Mood and Affect: Mood normal.         Behavior: Behavior normal.         Thought Content: Thought content normal.         Judgment: Judgment normal.       REVIEWED DATA      Labs:    Lab Results   Component Value Date     11/04/2024    K 4.1 11/04/2024    CALCIUM 9.6 11/04/2024    AST 18 11/04/2024    ALT 15 11/04/2024    BUN 13 11/04/2024    CREATININE 1.02 11/04/2024    CREATININE 1.06 10/13/2023    CREATININE 0.96 01/27/2021    EGFRIFNONA 91 01/27/2021    EGFRIFAFRI 111 01/27/2021     Lab Results   Component Value Date    GLUCOSE 93 11/04/2024    HGBA1C 5.30 11/04/2024    HGBA1C 5.60 10/13/2023    HGBA1C 5.30 01/27/2021    TSH 2.930 11/04/2024    FREET4 1.21 11/04/2024     Component      Latest Ref Rng 1/27/2021 10/13/2023 9/27/2024 11/4/2024   Total Cholesterol      0 - 200 mg/dL 202 (H)  239 (H)  230  "(H)  230 (H)    Triglycerides      0 - 150 mg/dL 62  60  79  53    LDL-P      <1,000 nmol/L   1,158 (H)     LDL-C      0 - 99 mg/dL   146 (H)     HDL-C      >39 mg/dL   70     HDL-P (Total)      >=30.5 umol/L   32.5     Small LDL-P      <=527 nmol/L   91     LDL Size      >20.5 nm   22.3          No results found for: \"PSA\"  No results found for: \"TESTOSTERONE\", \"TESTOSTEROTT\", \"TESTFRE\"  No results found for: \"LABLH\", \"FSH\"  Lab Results   Component Value Date     (H) 11/04/2024    HDL 72 (H) 11/04/2024    TRIG 53 11/04/2024    CHOLHDLRATIO 3.19 11/04/2024   No components found for: \"AYYT430D\"  Lab Results   Component Value Date    WBC 5.38 11/04/2024    HGB 14.4 11/04/2024    MCV 86.6 11/04/2024     11/04/2024     Lab Results   Component Value Date    PROTEIN Negative 11/04/2024    GLUCOSEU Negative 11/04/2024    BLOODU Negative 11/04/2024    NITRITEU Negative 11/04/2024    LEUKOCYTESUR Negative 11/04/2024     Lab Results   Component Value Date    HEPCVIRUSABY 0.1 01/27/2021       Imaging:                   Medical Tests:                 Summary of old records / correspondence / consultant report:               Request outside records:         ASSESSMENT & PLAN     ANNUAL WELLNESS EXAM / PHYSICAL     Other medical problems addressed today:  Problem List Items Addressed This Visit          High    Pure hypercholesterolemia (Chronic)    Current Assessment & Plan     Lab Results   Component Value Date     (H) 11/04/2024     (H) 10/13/2023     (H) 01/27/2021    TRIG 53 11/04/2024    CHOLHDLRATIO 3.19 11/04/2024      Maintain low saturated fat/cholesterol diet.    Red yeast rice 600 mg daily.   Check lipid and he would like to check Apo A/B at that time.          Relevant Medications    Red Yeast Rice 600 MG capsule    Other Relevant Orders    Apolipoprotein A-1    Apolipoprotein B    Lipid Panel With / Chol / HDL Ratio    Lipid Panel With / Chol / HDL Ratio       Medium    Overweight " (BMI 25.0-29.9) - Primary (Chronic)    Current Assessment & Plan     Wt Readings from Last 3 Encounters:   11/11/24 80.7 kg (178 lb)   11/08/23 75.8 kg (167 lb)   10/04/22 72.1 kg (159 lb)     Body mass index is 27.06 kg/m².     Maintain a low sugar/starch/carbohydrate diet.   Weight loss of 10-15 lbs recommended.          Atypical chest pain    Current Assessment & Plan     Trial of Pepcid AC BID suggested.           Other Visit Diagnoses       Fatigue, unspecified type        Relevant Orders    Testosterone, Free, Total    Vitamin D deficiency        Relevant Orders    Vitamin D,25-Hydroxy    Encounter for annual health examination        Relevant Orders    CBC & Differential    Comprehensive Metabolic Panel    Lipid Panel With / Chol / HDL Ratio    TSH+Free T4    Urinalysis With Microscopic If Indicated (No Culture) - Urine, Clean Catch    Hemoglobin A1c            Orders Placed This Encounter   Procedures    Testosterone, Free, Total     Standing Status:   Future     Standing Expiration Date:   11/11/2025     Order Specific Question:   Release to patient     Answer:   Routine Release [[id number]]    Vitamin D,25-Hydroxy     Standing Status:   Future     Standing Expiration Date:   11/11/2025     Order Specific Question:   Release to patient     Answer:   Routine Release [[id number]]    Apolipoprotein A-1     Standing Status:   Future     Standing Expiration Date:   11/11/2025     Order Specific Question:   Release to patient     Answer:   Routine Release [[id number]]    Apolipoprotein B     Standing Status:   Future     Standing Expiration Date:   11/11/2025     Order Specific Question:   Release to patient     Answer:   Routine Release [[id number]]    Comprehensive Metabolic Panel     Standing Status:   Future     Standing Expiration Date:   3/26/2026     Order Specific Question:   Release to patient     Answer:   Routine Release [[id number]]    Lipid Panel With / Chol / HDL Ratio     Standing Status:   Future      Standing Expiration Date:   3/26/2026     Order Specific Question:   Release to patient     Answer:   Routine Release [[id number]]    TSH+Free T4     Standing Status:   Future     Standing Expiration Date:   3/26/2026     Order Specific Question:   Release to patient     Answer:   Routine Release [[id number]]    Urinalysis With Microscopic If Indicated (No Culture) - Urine, Clean Catch     Standing Status:   Future     Standing Expiration Date:   3/26/2026     Order Specific Question:   Release to patient     Answer:   Routine Release [[id number]]    Hemoglobin A1c     Standing Status:   Future     Standing Expiration Date:   3/26/2026     Order Specific Question:   Release to patient     Answer:   Routine Release [[id number]]    Lipid Panel With / Chol / HDL Ratio     Standing Status:   Future     Standing Expiration Date:   11/11/2025     Order Specific Question:   Release to patient     Answer:   Routine Release [[id number]]    CBC & Differential     Standing Status:   Future     Standing Expiration Date:   3/26/2026     Order Specific Question:   Manual Differential     Answer:   No     Order Specific Question:   Release to patient     Answer:   Routine Release [[id number]]        Summary/Discussion:         Next Appointment with me: Visit date not found    Return in about 1 year (around 11/11/2025) for SCHEDULE COMBINED ANNUAL PHYSICAL & MEDICAL FU.      HEALTHCARE MAINTENANCE ISSUES     Health Maintenance   Topic Date Due    BMI FOLLOWUP  Never done    TDAP/TD VACCINES (1 - Tdap) Never done    ANNUAL PHYSICAL  11/08/2024    INFLUENZA VACCINE  03/31/2025 (Originally 8/1/2024)    LIPID PANEL  11/04/2025    HEPATITIS C SCREENING  Completed    Pneumococcal Vaccine 0-64  Aged Out    COVID-19 Vaccine  Discontinued       Cancer Screening:  Colon: Initial/Next screening at age: 45  Repeat colon cancer screening: N/A at this time  Prostate: No screening needed at this time  Lung: Does not meet criteria for lung  cancer screening.   Testicular: Recommended monthly self exam  Skin: Monthly self skin examination, annual exam by health professional  Other:    Miscellaneous Screening:  CV Screening: Lipid panel  DEXA (75+ or risk factors):   Other:     Immunization/Vaccinations:    Immunization History   Administered Date(s) Administered    COVID-19 (PFIZER) Purple Cap Monovalent 01/13/2021, 02/03/2021      Influenza: Patient deferred/declined flu vaccine (recommended annual vaccination)  Pneumococcal: Not needed at this time  COVID: Advised to consider taking the vaccine  RSV: Not indicated  Tetanus/Pertussis: Recommended here or at pharmacy  Hepatitis A: Recommended here or at pharmacy  Hepatitis B: Verify immunization records  Shingles: Not needed at this time  HPV: Advised to consider vaccine    Lifestyle Counseling:  Lifestyle Modifications: Attempt to lose weight, Improve dietary compliance, Begin progressive aerobic exercise program 3-5 days a week, Increase intensity/regularity of aerobic exercise, Maintain a low sugar/carbohydrate diet, Follow a low fat, low cholesterol diet, Make dinner the lightest meal of day, Improve sleep hygiene, Reduce exposure to stress if possible, Discussed better management of stress/anxiety, and Discussed sexual issues, safe sex practices, contraception  Safety Issues: Always wear seatbelt, Avoid texting while driving   Use sunscreen, regular skin examination  Recommended annual dental/vision examination.  Emotional/Stress/Sleep: Reviewed and  given when appropriate

## 2024-11-11 NOTE — ASSESSMENT & PLAN NOTE
Lab Results   Component Value Date     (H) 11/04/2024     (H) 10/13/2023     (H) 01/27/2021    TRIG 53 11/04/2024    CHOLHDLRATIO 3.19 11/04/2024      Maintain low saturated fat/cholesterol diet.    Red yeast rice 600 mg daily.   Check lipid and he would like to check Apo A/B at that time.

## 2025-03-21 ENCOUNTER — TELEMEDICINE (OUTPATIENT)
Dept: INTERNAL MEDICINE | Age: 35
End: 2025-03-21
Payer: COMMERCIAL

## 2025-03-21 DIAGNOSIS — E78.00 PURE HYPERCHOLESTEROLEMIA: Primary | Chronic | ICD-10-CM

## 2025-03-21 PROCEDURE — 99214 OFFICE O/P EST MOD 30 MIN: CPT | Performed by: INTERNAL MEDICINE

## 2025-03-21 NOTE — ASSESSMENT & PLAN NOTE
Lab Results   Component Value Date     (H) 02/21/2025     (H) 11/04/2024     (H) 10/13/2023    TRIG 68 02/21/2025    CHOLHDLRATIO 3.4 02/21/2025      High apolipoprotein A and B.  Apolipoprotein a is greater than 180.  LDL cholesterol is notably worse than last time.  I recommended statin therapy for primary prevention but he defers at this time and would like to continue working on dietary and lifestyle modifications.  Will recheck lipid panel in 3 months to reassess.  He will have this lab performed downstairs.  Order placed in the system.  Maintain low saturated fat diet and low-cholesterol diet.  Continue to work on weight loss measures.

## 2025-03-21 NOTE — PROGRESS NOTES
J  U  N  O  H    K  I  M ,   M  D      I  N  T  E  R  N  A  L    M  E  D  I  C  I  N  E         ENCOUNTER DATE:  03/21/2025    Brant Vasquez / 34 y.o. / male    TELEHEALTH VISIT ENCOUNTER     You have chosen to receive care through a telehealth visit  Mode of Visit: Video with audio (visit included audio and video interaction)  No technical issues occurred during this visit  The patient has signed the video visit consent form.  Location of patient: -WORK-  Location of provider: +[facility]    CHIEF COMPLAINT / REASON FOR OFFICE VISIT     Hyperlipidemia      ASSESSMENT & PLAN     Problem List Items Addressed This Visit          High    Pure hypercholesterolemia - Primary (Chronic)    Current Assessment & Plan      Lab Results   Component Value Date     (H) 02/21/2025     (H) 11/04/2024     (H) 10/13/2023    TRIG 68 02/21/2025    CHOLHDLRATIO 3.4 02/21/2025      High apolipoprotein A and B.  Apolipoprotein a is greater than 180.  LDL cholesterol is notably worse than last time.  I recommended statin therapy for primary prevention but he defers at this time and would like to continue working on dietary and lifestyle modifications.  Will recheck lipid panel in 3 months to reassess.  He will have this lab performed downstairs.  Order placed in the system.  Maintain low saturated fat diet and low-cholesterol diet.  Continue to work on weight loss measures.         Relevant Medications    Red Yeast Rice 600 MG capsule    Other Relevant Orders    Lipid Panel With / Chol / HDL Ratio     Orders Placed This Encounter   Procedures    Lipid Panel With / Chol / HDL Ratio     Standing Status:   Future     Expected Date:   6/2/2025     Expiration Date:   3/21/2026     Release to patient:   Routine Release [[id number]]     No orders of the defined types were placed in this encounter.      SUMMARY/DISCUSSION        TOTAL TIME OF ENCOUNTER:  20 MINUTES      Next Appointment with me:  11/12/2025    No follow-ups on file.      VITAL SIGNS     There were no vitals filed for this visit.    BP Readings from Last 3 Encounters:   11/11/24 120/76   11/08/23 126/88   10/04/22 100/68     Wt Readings from Last 3 Encounters:   11/11/24 80.7 kg (178 lb)   11/08/23 75.8 kg (167 lb)   10/04/22 72.1 kg (159 lb)     There is no height or weight on file to calculate BMI.    Blood pressure readings recorded on patient flowsheet:       No data to display                  MEDICATIONS AT THE TIME OF OFFICE VISIT     Current Outpatient Medications on File Prior to Visit   Medication Sig    Multiple Vitamins-Minerals (ZINC PO) Take  by mouth.    Red Yeast Rice 600 MG capsule Take 1 capsule by mouth Daily.    vitamin D3 125 MCG (5000 UT) capsule capsule Take 1 capsule by mouth Daily.     No current facility-administered medications on file prior to visit.          HISTORY OF PRESENT ILLNESS     Telehealth visit to discuss most recent lab results.  LDL cholesterol is noted to be significantly higher greater than 180.  Apolipoprotein a is greater than 180.  Apolipoprotein B is also high.  He defers statin medication therapy at this time.  Patient states that he has not been watching his diet as much in terms of fat and cholesterol intake because he has been focusing on restricting carbohydrates.  His father had a stroke in his 50s.  He is unsure whether his dad takes a cholesterol-lowering medication at this time.  His mother has history of high cholesterol but is unsure if she is taking the medication.    Patient Care Team:  Neo Hayes MD as PCP - General (Internal Medicine)    REVIEW OF SYSTEMS     Review of Systems       PHYSICAL EXAMINATION     Physical Exam  Alert with normal thought and judgment.       REVIEWED DATA     Labs:     Lab Results   Component Value Date     11/04/2024    K 4.1 11/04/2024    CALCIUM 9.6 11/04/2024    AST 18 11/04/2024    ALT 15 11/04/2024    BUN 13 11/04/2024    CREATININE 1.02  "11/04/2024    CREATININE 1.06 10/13/2023    CREATININE 0.96 01/27/2021     Lab Results   Component Value Date    HGBA1C 5.30 11/04/2024    HGBA1C 5.60 10/13/2023    HGBA1C 5.30 01/27/2021   No results found for: \"MALBCRERATIO\"  Lab Results   Component Value Date     (H) 02/21/2025     (H) 11/04/2024     (H) 10/13/2023    HDL 81 02/21/2025    HDL 72 (H) 11/04/2024    TRIG 68 02/21/2025    TRIG 53 11/04/2024     Lab Results   Component Value Date    TSH 2.930 11/04/2024    TSH 3.960 10/13/2023    TSH 2.640 01/27/2021    FREET4 1.21 11/04/2024    FREET4 1.37 10/13/2023    FREET4 1.23 01/27/2021     Lab Results   Component Value Date    WBC 5.38 11/04/2024    HGB 14.4 11/04/2024     11/04/2024           Imaging:               Medical Tests:               Summary of old records / correspondence / consultant report:             Request outside records:     "

## 2025-07-31 ENCOUNTER — LAB (OUTPATIENT)
Facility: HOSPITAL | Age: 35
End: 2025-07-31
Payer: COMMERCIAL

## 2025-07-31 DIAGNOSIS — E78.00 PURE HYPERCHOLESTEROLEMIA: Chronic | ICD-10-CM

## 2025-07-31 DIAGNOSIS — Z00.00 ENCOUNTER FOR ANNUAL HEALTH EXAMINATION: ICD-10-CM

## 2025-07-31 LAB
ALBUMIN SERPL-MCNC: 4.6 G/DL (ref 3.5–5.2)
ALBUMIN/GLOB SERPL: 1.7 G/DL
ALP SERPL-CCNC: 53 U/L (ref 39–117)
ALT SERPL W P-5'-P-CCNC: 17 U/L (ref 1–41)
ANION GAP SERPL CALCULATED.3IONS-SCNC: 9.6 MMOL/L (ref 5–15)
AST SERPL-CCNC: 19 U/L (ref 1–40)
BASOPHILS # BLD AUTO: 0.08 10*3/MM3 (ref 0–0.2)
BASOPHILS NFR BLD AUTO: 1.2 % (ref 0–1.5)
BILIRUB SERPL-MCNC: 0.6 MG/DL (ref 0–1.2)
BUN SERPL-MCNC: 16 MG/DL (ref 6–20)
BUN/CREAT SERPL: 14 (ref 7–25)
CALCIUM SPEC-SCNC: 9.5 MG/DL (ref 8.6–10.5)
CHLORIDE SERPL-SCNC: 104 MMOL/L (ref 98–107)
CHOLEST SERPL-MCNC: 225 MG/DL (ref 0–200)
CO2 SERPL-SCNC: 24.4 MMOL/L (ref 22–29)
CREAT SERPL-MCNC: 1.14 MG/DL (ref 0.76–1.27)
DEPRECATED RDW RBC AUTO: 42.5 FL (ref 37–54)
EGFRCR SERPLBLD CKD-EPI 2021: 86 ML/MIN/1.73
EOSINOPHIL # BLD AUTO: 0.36 10*3/MM3 (ref 0–0.4)
EOSINOPHIL NFR BLD AUTO: 5.2 % (ref 0.3–6.2)
ERYTHROCYTE [DISTWIDTH] IN BLOOD BY AUTOMATED COUNT: 12.8 % (ref 12.3–15.4)
GLOBULIN UR ELPH-MCNC: 2.7 GM/DL
GLUCOSE SERPL-MCNC: 86 MG/DL (ref 65–99)
HBA1C MFR BLD: 5.2 % (ref 4.8–5.6)
HCT VFR BLD AUTO: 44 % (ref 37.5–51)
HDLC SERPL QL: 3.31
HDLC SERPL-MCNC: 68 MG/DL (ref 40–60)
HGB BLD-MCNC: 14.2 G/DL (ref 13–17.7)
IMM GRANULOCYTES # BLD AUTO: 0.01 10*3/MM3 (ref 0–0.05)
IMM GRANULOCYTES NFR BLD AUTO: 0.1 % (ref 0–0.5)
LDLC SERPL CALC-MCNC: 150 MG/DL (ref 0–100)
LYMPHOCYTES # BLD AUTO: 2.63 10*3/MM3 (ref 0.7–3.1)
LYMPHOCYTES NFR BLD AUTO: 38.2 % (ref 19.6–45.3)
MCH RBC QN AUTO: 29.4 PG (ref 26.6–33)
MCHC RBC AUTO-ENTMCNC: 32.3 G/DL (ref 31.5–35.7)
MCV RBC AUTO: 91.1 FL (ref 79–97)
MONOCYTES # BLD AUTO: 0.43 10*3/MM3 (ref 0.1–0.9)
MONOCYTES NFR BLD AUTO: 6.2 % (ref 5–12)
NEUTROPHILS NFR BLD AUTO: 3.38 10*3/MM3 (ref 1.7–7)
NEUTROPHILS NFR BLD AUTO: 49.1 % (ref 42.7–76)
NRBC BLD AUTO-RTO: 0 /100 WBC (ref 0–0.2)
PLATELET # BLD AUTO: 210 10*3/MM3 (ref 140–450)
PMV BLD AUTO: 10.4 FL (ref 6–12)
POTASSIUM SERPL-SCNC: 4.1 MMOL/L (ref 3.5–5.2)
PROT SERPL-MCNC: 7.3 G/DL (ref 6–8.5)
RBC # BLD AUTO: 4.83 10*6/MM3 (ref 4.14–5.8)
SODIUM SERPL-SCNC: 138 MMOL/L (ref 136–145)
T4 FREE SERPL-MCNC: 1.19 NG/DL (ref 0.92–1.68)
TRIGL SERPL-MCNC: 42 MG/DL (ref 0–150)
TSH SERPL DL<=0.05 MIU/L-ACNC: 2.8 UIU/ML (ref 0.27–4.2)
VLDLC SERPL-MCNC: 7 MG/DL (ref 5–40)
WBC NRBC COR # BLD AUTO: 6.89 10*3/MM3 (ref 3.4–10.8)

## 2025-07-31 PROCEDURE — 83036 HEMOGLOBIN GLYCOSYLATED A1C: CPT

## 2025-07-31 PROCEDURE — 84439 ASSAY OF FREE THYROXINE: CPT

## 2025-07-31 PROCEDURE — 80050 GENERAL HEALTH PANEL: CPT

## 2025-07-31 PROCEDURE — 80061 LIPID PANEL: CPT

## 2025-07-31 PROCEDURE — 36415 COLL VENOUS BLD VENIPUNCTURE: CPT

## 2025-08-14 DIAGNOSIS — B35.4 RINGWORM OF BODY: Primary | ICD-10-CM

## 2025-08-14 RX ORDER — CLOTRIMAZOLE AND BETAMETHASONE DIPROPIONATE 10; .64 MG/G; MG/G
1 CREAM TOPICAL 2 TIMES DAILY
Qty: 15 G | Refills: 0 | Status: SHIPPED | OUTPATIENT
Start: 2025-08-14